# Patient Record
Sex: FEMALE | Race: WHITE | NOT HISPANIC OR LATINO
[De-identification: names, ages, dates, MRNs, and addresses within clinical notes are randomized per-mention and may not be internally consistent; named-entity substitution may affect disease eponyms.]

---

## 2018-02-12 ENCOUNTER — RESULT REVIEW (OUTPATIENT)
Age: 38
End: 2018-02-12

## 2018-03-06 ENCOUNTER — TRANSCRIPTION ENCOUNTER (OUTPATIENT)
Age: 38
End: 2018-03-06

## 2018-03-06 ENCOUNTER — APPOINTMENT (OUTPATIENT)
Dept: SURGERY | Facility: CLINIC | Age: 38
End: 2018-03-06
Payer: COMMERCIAL

## 2018-03-06 VITALS
BODY MASS INDEX: 34.82 KG/M2 | HEIGHT: 63.5 IN | SYSTOLIC BLOOD PRESSURE: 131 MMHG | HEART RATE: 115 BPM | DIASTOLIC BLOOD PRESSURE: 81 MMHG | WEIGHT: 199 LBS

## 2018-03-06 DIAGNOSIS — Z78.9 OTHER SPECIFIED HEALTH STATUS: ICD-10-CM

## 2018-03-06 DIAGNOSIS — Z80.6 FAMILY HISTORY OF LEUKEMIA: ICD-10-CM

## 2018-03-06 PROCEDURE — 99243 OFF/OP CNSLTJ NEW/EST LOW 30: CPT

## 2018-03-15 ENCOUNTER — OUTPATIENT (OUTPATIENT)
Dept: OUTPATIENT SERVICES | Facility: HOSPITAL | Age: 38
LOS: 1 days | End: 2018-03-15

## 2018-03-15 VITALS
HEART RATE: 97 BPM | TEMPERATURE: 99 F | RESPIRATION RATE: 16 BRPM | SYSTOLIC BLOOD PRESSURE: 120 MMHG | OXYGEN SATURATION: 99 % | HEIGHT: 63.5 IN | WEIGHT: 205.03 LBS | DIASTOLIC BLOOD PRESSURE: 80 MMHG

## 2018-03-15 DIAGNOSIS — D48.5 NEOPLASM OF UNCERTAIN BEHAVIOR OF SKIN: ICD-10-CM

## 2018-03-15 DIAGNOSIS — D48.9 NEOPLASM OF UNCERTAIN BEHAVIOR, UNSPECIFIED: ICD-10-CM

## 2018-03-15 LAB
HCT VFR BLD CALC: 43.1 % — SIGNIFICANT CHANGE UP (ref 34.5–45)
HGB BLD-MCNC: 14.5 G/DL — SIGNIFICANT CHANGE UP (ref 11.5–15.5)
MCHC RBC-ENTMCNC: 29.3 PG — SIGNIFICANT CHANGE UP (ref 27–34)
MCHC RBC-ENTMCNC: 33.6 % — SIGNIFICANT CHANGE UP (ref 32–36)
MCV RBC AUTO: 87.1 FL — SIGNIFICANT CHANGE UP (ref 80–100)
NRBC # FLD: 0 — SIGNIFICANT CHANGE UP
PLATELET # BLD AUTO: 316 K/UL — SIGNIFICANT CHANGE UP (ref 150–400)
PMV BLD: 9.6 FL — SIGNIFICANT CHANGE UP (ref 7–13)
RBC # BLD: 4.95 M/UL — SIGNIFICANT CHANGE UP (ref 3.8–5.2)
RBC # FLD: 12.2 % — SIGNIFICANT CHANGE UP (ref 10.3–14.5)
WBC # BLD: 6.92 K/UL — SIGNIFICANT CHANGE UP (ref 3.8–10.5)
WBC # FLD AUTO: 6.92 K/UL — SIGNIFICANT CHANGE UP (ref 3.8–10.5)

## 2018-03-15 NOTE — H&P PST ADULT - FAMILY HISTORY
Father  Still living? No  Family history of pericarditis, Age at diagnosis: Age Unknown     Mother  Still living? Yes, Estimated age: Age Unknown  Family history of CLL (chronic lymphoid leukemia), Age at diagnosis: Age Unknown

## 2018-03-15 NOTE — H&P PST ADULT - NSCAFFEAMTFREQ_GEN_ALL_CORE_SD
Discharge Summary/Instructions after an Endoscopic Procedure  Patient Name: Laz Quintero  Patient MRN: [id number]  Patient YOB: 1973 Friday, January 19, 2018  Malachi Olmedo MD  RESTRICTIONS:  During your procedure today, you received medications for sedation.  These   medications may affect your judgment, balance and coordination.  Therefore,   for 24 hours, you have the following restrictions:   - DO NOT drive a car, operate machinery, make legal/financial decisions,   sign important papers or drink alcohol.    ACTIVITY:  The following day: return to full activity including work, except no heavy   lifting, straining or running for 3 days if polyps were removed.  DIET:  Eat and drink normally unless instructed otherwise.     TREATMENT FOR COMMON SIDE EFFECTS:  - Mild abdominal pain, belching, bloating or excessive gas: rest, eat   lightly and use a heating pad.  - Sore Throat: treat with throat lozenges and/or gargle with warm salt   water.  SYMPTOMS TO WATCH FOR AND REPORT TO YOUR PHYSICIAN:  1. Abdominal pain or bloating, other than gas cramps.  2. Chest pain.  3. Back pain.  4. Chills or fever occurring within 24 hours after the procedure.  5. Rectal bleeding, which would show as bright red, maroon, or black stools.   (A tablespoon of blood from the rectum is not serious, especially if   hemorrhoids are present.)  6. Vomiting.  7. Weakness or dizziness.  8. Because air was used during the procedure, expelling large amounts of air   from your rectum or belching is normal.  9. If a bowel prep was taken, you may not have a bowel movement for 1-3   days.  This is normal.  GO DIRECTLY TO THE NEAREST EMERGENCY ROOM IF YOU HAVE ANY OF THE FOLLOWING:      Difficulty breathing  Chills and/or fever over 101 F   Persistent vomiting and/or vomiting blood   Severe abdominal pain   Severe chest pain   Black, tarry stools   Bleeding- more than one tablespoon   Any other symptom or condition that you may feel needs  urgent attention  Your doctor recommends these additional instructions:  If any biopsies were taken, your doctor s clinic will contact you in 1 to 2   weeks with any results.  We are waiting for your pathology results.   Telephone your GI clinic for pathology results in two weeks.   Your physician has recommended a colonoscopy now.  For questions, problems or results please call your physician - Malachi Olmedo MD at Work:  [phone].  [facility], EMERGENCY ROOM PHONE NUMBER: [phone]  IF A COMPLICATION OR EMERGENCY SITUATION ARISES AND YOU ARE UNABLE TO REACH   YOUR PHYSICIAN - GO DIRECTLY TO THE EMERGENCY ROOM.  Malachi Olmedo MD  1/19/2018 11:20:02 AM  This report has been verified and signed electronically.   1-2 cups/cans per day

## 2018-03-15 NOTE — H&P PST ADULT - NEGATIVE MUSCULOSKELETAL SYMPTOMS
no arthritis/no back pain/no joint swelling/no muscle weakness/no muscle cramps/no stiffness/no myalgia/no neck pain

## 2018-03-15 NOTE — H&P PST ADULT - NSANTHOSAYNRD_GEN_A_CORE
No. PACO screening performed.  STOP BANG Legend: 0-2 = LOW Risk; 3-4 = INTERMEDIATE Risk; 5-8 = HIGH Risk

## 2018-03-15 NOTE — H&P PST ADULT - MAMMOGRAM, LAST, PROFILE
Problem: Pressure Injury, Risk for  Goal: # Skin remains intact  Outcome: Outcome Met, Continue evaluating goal progress toward completion  Pt skin remains intact. No new pressure injury development. Pt repositions independently.     Problem: VTE, Risk for  Goal: # No s/s of VTE  Outcome: Outcome Met, Continue evaluating goal progress toward completion  No s/s of VTE.     Problem: Pain  Goal: #Acceptable pain/comfort level is achieved/maintained at rest (based on self report using Numeric Rating Scales/Faces  Outcome: Outcome Met, Continue evaluating goal progress toward completion  Pt has reported no pain throughout this shift.     Problem: Hemodialysis  Goal: Fistula/graft intact as evidenced by presence of bruit & thrill  Outcome: Outcome Met, Continue evaluating goal progress toward completion  Bruit and Thrill present.        3 years ago

## 2018-03-15 NOTE — H&P PST ADULT - ANESTHESIA, PREVIOUS REACTION, PROFILE
none/states no complications with anesthesia but they during her last  they told her she had scar tissue and had difficulty placing spinal - pt required general anesthesia states no complications with anesthesia but that during her last  they told her she had scar tissue and had difficulty placing spinal - pt required general anesthesia/none

## 2018-03-15 NOTE — H&P PST ADULT - PROBLEM SELECTOR PLAN 1
Excision Right Shoulder Mass scheduled on 3/16/18.  Pre-op instructions provided. Pt verbalized understanding.   Pepcid provided for GI prophylaxis.   Chlorhexidine wash provided and instructions given.

## 2018-03-15 NOTE — H&P PST ADULT - MUSCULOSKELETAL
details… detailed exam no calf tenderness/ROM intact/normal strength/no joint swelling/no joint erythema/no joint warmth

## 2018-03-15 NOTE — H&P PST ADULT - HISTORY OF PRESENT ILLNESS
37 year old female with lipoma to right shoulder increasing in size over the past 4 years. Pt presents today for presurgical evaluation for.. 37 year old female with soft nontender mass to right shoulder increasing in size over the past 4 years. Pt presents today for presurgical evaluation for Excision Right Shoulder Mass scheduled on 3/16/18.

## 2018-03-16 ENCOUNTER — OUTPATIENT (OUTPATIENT)
Dept: OUTPATIENT SERVICES | Facility: HOSPITAL | Age: 38
LOS: 1 days | Discharge: HOME | End: 2018-03-16
Payer: COMMERCIAL

## 2018-03-16 ENCOUNTER — OTHER (OUTPATIENT)
Age: 38
End: 2018-03-16

## 2018-03-16 ENCOUNTER — APPOINTMENT (OUTPATIENT)
Dept: SURGERY | Facility: HOSPITAL | Age: 38
End: 2018-03-16

## 2018-03-16 ENCOUNTER — TRANSCRIPTION ENCOUNTER (OUTPATIENT)
Age: 38
End: 2018-03-16

## 2018-03-16 ENCOUNTER — RESULT REVIEW (OUTPATIENT)
Age: 38
End: 2018-03-16

## 2018-03-16 VITALS
WEIGHT: 205.03 LBS | OXYGEN SATURATION: 100 % | TEMPERATURE: 98 F | HEIGHT: 63.5 IN | DIASTOLIC BLOOD PRESSURE: 72 MMHG | RESPIRATION RATE: 18 BRPM | HEART RATE: 84 BPM | SYSTOLIC BLOOD PRESSURE: 115 MMHG

## 2018-03-16 VITALS
RESPIRATION RATE: 16 BRPM | OXYGEN SATURATION: 100 % | DIASTOLIC BLOOD PRESSURE: 59 MMHG | SYSTOLIC BLOOD PRESSURE: 106 MMHG | HEART RATE: 65 BPM | TEMPERATURE: 98 F

## 2018-03-16 DIAGNOSIS — D48.5 NEOPLASM OF UNCERTAIN BEHAVIOR OF SKIN: ICD-10-CM

## 2018-03-16 PROCEDURE — 23073 EXC SHOULDER TUM DEEP 5 CM/>: CPT

## 2018-03-16 PROCEDURE — 88304 TISSUE EXAM BY PATHOLOGIST: CPT | Mod: 26

## 2018-03-16 PROCEDURE — 13101 CMPLX RPR TRUNK 2.6-7.5 CM: CPT | Mod: 59

## 2018-03-16 NOTE — BRIEF OPERATIVE NOTE - PROCEDURE
<<-----Click on this checkbox to enter Procedure Excision of mass of right shoulder  03/16/2018    Active  DEBORAH

## 2018-03-16 NOTE — ASU DISCHARGE PLAN (ADULT/PEDIATRIC). - NOTIFY
Swelling that continues/Persistent Nausea and Vomiting/Fever greater than 101/Inability to Tolerate Liquids or Foods/Bleeding that does not stop/Pain not relieved by Medications

## 2018-03-26 LAB — SURGICAL PATHOLOGY STUDY: SIGNIFICANT CHANGE UP

## 2018-03-27 ENCOUNTER — APPOINTMENT (OUTPATIENT)
Dept: SURGERY | Facility: CLINIC | Age: 38
End: 2018-03-27
Payer: COMMERCIAL

## 2018-03-27 PROCEDURE — 99213 OFFICE O/P EST LOW 20 MIN: CPT

## 2018-07-10 ENCOUNTER — APPOINTMENT (OUTPATIENT)
Dept: SURGERY | Facility: CLINIC | Age: 38
End: 2018-07-10
Payer: COMMERCIAL

## 2018-07-10 ENCOUNTER — APPOINTMENT (OUTPATIENT)
Dept: SURGERY | Facility: CLINIC | Age: 38
End: 2018-07-10

## 2018-07-10 PROCEDURE — 99213 OFFICE O/P EST LOW 20 MIN: CPT

## 2018-09-11 NOTE — ASU DISCHARGE PLAN (ADULT/PEDIATRIC). - FOLLOWUP APPOINTMENT CLINIC/PHYSICIAN
DOS 11/5/18 [place]    Confirm with patient:pt  Laterality: right  Insurance: emery  PCP: pt will call back to let me know which PCP he will be seeing and date of appointment  Weight Loss medications: no  Case:  yes  Preop: yes  Postop:  yes  Surg in [place]: yes  [id number]: yes  Conf letter mailed on: 9/11/18  Verbally confirmed no aspirin or anti-inflammatory meds or weight loss medications for 10 days prior to surgery, NPO instructions 8 hours prior/NPO after midnight night before surgery, preop with PCP 2-3 weeks prior to surgery, ride home if Out Patient and anesthesia: yes  Service to FP/IM/Cardiology: NEED TO SEND WHEN PATIENT CALLS BACK WITH PHYSICIANS NAME  Messaged [phone] [address] nurse pool with surgery date for patient: yes      Surgery scheduled   Received: Today   Message Contents   Kathy Dumont Nurse Msg Pool      Â       Surgery scheduled 11/5/18 [place]. Right TKA. Make appointment for  1 to 2 weeks with Dr. Darnell after surgery.

## 2019-02-26 ENCOUNTER — RESULT REVIEW (OUTPATIENT)
Age: 39
End: 2019-02-26

## 2019-06-03 ENCOUNTER — TRANSCRIPTION ENCOUNTER (OUTPATIENT)
Age: 39
End: 2019-06-03

## 2020-03-13 PROBLEM — H93.19 TINNITUS, UNSPECIFIED EAR: Chronic | Status: ACTIVE | Noted: 2018-03-15

## 2020-03-13 PROBLEM — Z97.5 PRESENCE OF (INTRAUTERINE) CONTRACEPTIVE DEVICE: Chronic | Status: ACTIVE | Noted: 2018-03-15

## 2020-04-08 ENCOUNTER — TRANSCRIPTION ENCOUNTER (OUTPATIENT)
Age: 40
End: 2020-04-08

## 2020-04-20 ENCOUNTER — APPOINTMENT (OUTPATIENT)
Dept: ENDOCRINOLOGY | Facility: CLINIC | Age: 40
End: 2020-04-20
Payer: COMMERCIAL

## 2020-04-20 DIAGNOSIS — Z86.018 PERSONAL HISTORY OF OTHER BENIGN NEOPLASM: ICD-10-CM

## 2020-04-20 DIAGNOSIS — R76.8 OTHER SPECIFIED ABNORMAL IMMUNOLOGICAL FINDINGS IN SERUM: ICD-10-CM

## 2020-04-20 DIAGNOSIS — L65.9 NONSCARRING HAIR LOSS, UNSPECIFIED: ICD-10-CM

## 2020-04-20 PROCEDURE — 99203 OFFICE O/P NEW LOW 30 MIN: CPT | Mod: 95

## 2020-04-20 NOTE — REASON FOR VISIT
[Other Location: e.g. School (Enter Location, City,State)___] : at [unfilled], at the time of the visit. [Other Location: e.g. Home (Enter Location, City,State)___] : at [unfilled] [Patient] : the patient [Consultation] : a consultation visit [Other___] : [unfilled] [FreeTextEntry2] : Dr. Wilfrido Mcgrath

## 2020-04-20 NOTE — ASSESSMENT
[FreeTextEntry1] : This is a 39 year old female with hair loss and elevated DHEAS level. \par She has had normal menstrual cycle throughout her life. She has a Mirena IUD. \par Check repeat thyroid abs as she reports tg ab were positive in the past. \par Check TFTs. \par Check testosterone. \par Further management including possibly starting thyroid hormone replacement will be based on above results.

## 2020-04-20 NOTE — CONSULT LETTER
[Dear  ___] : Dear  [unfilled], [Please see my note below.] : Please see my note below. [Consult Letter:] : I had the pleasure of evaluating your patient, [unfilled]. [Consult Closing:] : Thank you very much for allowing me to participate in the care of this patient.  If you have any questions, please do not hesitate to contact me. [Sincerely,] : Sincerely, [FreeTextEntry3] : Shoaib Maier MD, FACE\par

## 2020-04-20 NOTE — REVIEW OF SYSTEMS
[Acne] : acne [Hair Loss] : hair loss [Anxiety] : anxiety [All other systems negative] : All other systems negative [FreeTextEntry8] : dysmenorrhea

## 2020-04-20 NOTE — PAST MEDICAL HISTORY
[Menstruating] : The patient is menstruating [Excessive Bleeding] : there was excessive bleeding [Menarche Age ____] : age at menarche was [unfilled] [Dysmenorrhea] : dysmenorrhea [Regular Cycle Intervals] : have been regular [Live Births ___] : P[unfilled]

## 2020-04-20 NOTE — HISTORY OF PRESENT ILLNESS
[FreeTextEntry1] : Mrs. Alexis was the only participant in this video viist. \par \par CC: Check hormones\par \par This is a 39 year old female with hair loss and elevated DHEAS level. \par She reports that she has been experiencing hair loss/thinning for a few years. She had recent bloodwork which showed DHEAS of 285 and repeat 319. She has taken Biotin in the past and was on Biotin during second blood test. \par She was told in the past that she has positive thyroglobulin abs. She is not on thyroid hormone replacement. \par She has a mirena IUD. \par

## 2020-05-01 LAB
ALBUMIN SERPL ELPH-MCNC: 4.7 G/DL
ALP BLD-CCNC: 52 U/L
ALT SERPL-CCNC: 10 U/L
ANION GAP SERPL CALC-SCNC: 17 MMOL/L
AST SERPL-CCNC: 12 U/L
BASOPHILS # BLD AUTO: 0.04 K/UL
BASOPHILS NFR BLD AUTO: 0.5 %
BILIRUB SERPL-MCNC: 0.9 MG/DL
BUN SERPL-MCNC: 9 MG/DL
CALCIUM SERPL-MCNC: 9.5 MG/DL
CHLORIDE SERPL-SCNC: 102 MMOL/L
CO2 SERPL-SCNC: 21 MMOL/L
CREAT SERPL-MCNC: 0.8 MG/DL
DHEA-S SERPL-MCNC: 310 UG/DL
EOSINOPHIL # BLD AUTO: 0.07 K/UL
EOSINOPHIL NFR BLD AUTO: 0.9 %
GLUCOSE SERPL-MCNC: 70 MG/DL
HCT VFR BLD CALC: 44 %
HGB BLD-MCNC: 14.1 G/DL
IMM GRANULOCYTES NFR BLD AUTO: 0.3 %
LYMPHOCYTES # BLD AUTO: 2.63 K/UL
LYMPHOCYTES NFR BLD AUTO: 34 %
MAN DIFF?: NORMAL
MCHC RBC-ENTMCNC: 29.1 PG
MCHC RBC-ENTMCNC: 32 GM/DL
MCV RBC AUTO: 90.9 FL
MONOCYTES # BLD AUTO: 0.52 K/UL
MONOCYTES NFR BLD AUTO: 6.7 %
NEUTROPHILS # BLD AUTO: 4.46 K/UL
NEUTROPHILS NFR BLD AUTO: 57.6 %
PLATELET # BLD AUTO: 320 K/UL
POTASSIUM SERPL-SCNC: 4.5 MMOL/L
PROT SERPL-MCNC: 7.3 G/DL
RBC # BLD: 4.84 M/UL
RBC # FLD: 12.5 %
SODIUM SERPL-SCNC: 140 MMOL/L
T4 FREE SERPL-MCNC: 1.3 NG/DL
THYROGLOB AB SERPL-ACNC: <20 IU/ML
THYROPEROXIDASE AB SERPL IA-ACNC: <10 IU/ML
TSH SERPL-ACNC: 3.84 UIU/ML
WBC # FLD AUTO: 7.74 K/UL

## 2020-05-06 LAB
TESTOST BND SERPL-MCNC: 3.2 PG/ML
TESTOST SERPL-MCNC: 23.6 NG/DL

## 2020-11-03 LAB — DHEA-S SERPL-MCNC: 316 UG/DL

## 2020-11-05 DIAGNOSIS — E27.8 OTHER SPECIFIED DISORDERS OF ADRENAL GLAND: ICD-10-CM

## 2021-05-13 ENCOUNTER — LABORATORY RESULT (OUTPATIENT)
Age: 41
End: 2021-05-13

## 2022-05-25 ENCOUNTER — APPOINTMENT (OUTPATIENT)
Dept: PULMONOLOGY | Facility: CLINIC | Age: 42
End: 2022-05-25

## 2023-01-27 ENCOUNTER — OFFICE (OUTPATIENT)
Dept: URBAN - METROPOLITAN AREA CLINIC 90 | Facility: CLINIC | Age: 43
Setting detail: OPHTHALMOLOGY
End: 2023-01-27
Payer: MEDICAID

## 2023-01-27 DIAGNOSIS — H01.002: ICD-10-CM

## 2023-01-27 DIAGNOSIS — E05.00: ICD-10-CM

## 2023-01-27 DIAGNOSIS — H01.001: ICD-10-CM

## 2023-01-27 DIAGNOSIS — H01.004: ICD-10-CM

## 2023-01-27 PROBLEM — H01.005 BLEPHARITIS; RIGHT UPPER LID, LEFT UPPER LID , RIGHT LOWER LID, LEFT LOWER LID: Status: ACTIVE | Noted: 2023-01-27

## 2023-01-27 PROCEDURE — 92014 COMPRE OPH EXAM EST PT 1/>: CPT | Performed by: OPHTHALMOLOGY

## 2023-01-27 ASSESSMENT — KERATOMETRY
OS_K1POWER_DIOPTERS: 44.25
OD_AXISANGLE_DEGREES: 078
OD_K2POWER_DIOPTERS: 45.75
METHOD_AUTO_MANUAL: AUTO
OD_K1POWER_DIOPTERS: 44.50
OS_AXISANGLE_DEGREES: 103
OS_K2POWER_DIOPTERS: 45.75

## 2023-01-27 ASSESSMENT — REFRACTION_MANIFEST
OD_VA1: 20/20
OS_CYLINDER: -1.50
OU_VA: 20/20
OS_CYLINDER: -1.50
OD_SPHERE: -3.25
OS_SPHERE: -6.25
OS_AXIS: 020
OU_VA: 20/20
OS_SPHERE: -4.25
OD_SPHERE: -5.50
OD_VA1: 20/20
OS_VA1: 20/20
OS_VA1: 20/20
OS_AXIS: 020
OD_AXIS: 162
OD_CYLINDER: -1.25
OD_CYLINDER: -1.25
OD_AXIS: 162

## 2023-01-27 ASSESSMENT — REFRACTION_CURRENTRX
OS_CYLINDER: -1.25
OS_CYLINDER: +1.25
OS_VPRISM_DIRECTION: SV
OS_OVR_VA: 20/
OS_AXIS: 108
OS_SPHERE: -7.00
OD_OVR_VA: 20/
OD_CYLINDER: +1.00
OS_OVR_VA: 20/
OD_SPHERE: -5.75
OD_AXIS: 160
OS_VPRISM_DIRECTION: SV
OD_VPRISM_DIRECTION: SV
OD_OVR_VA: 20/
OS_AXIS: 025
OD_AXIS: 061
OD_VPRISM_DIRECTION: SV
OD_CYLINDER: -1.00
OD_SPHERE: -6.75
OS_SPHERE: -5.75

## 2023-01-27 ASSESSMENT — VISUAL ACUITY
OS_BCVA: 20/20-1
OD_BCVA: 20/25+2

## 2023-01-27 ASSESSMENT — TONOMETRY
OS_IOP_MMHG: 19
OD_IOP_MMHG: 20

## 2023-01-27 ASSESSMENT — REFRACTION_AUTOREFRACTION
OD_SPHERE: -5.25
OS_CYLINDER: -1.50
OS_AXIS: 020
OD_CYLINDER: -1.25
OD_AXIS: 162
OS_SPHERE: -5.75

## 2023-01-27 ASSESSMENT — CONFRONTATIONAL VISUAL FIELD TEST (CVF)
OD_FINDINGS: FULL
OS_FINDINGS: FULL

## 2023-01-27 ASSESSMENT — SPHEQUIV_DERIVED
OD_SPHEQUIV: -3.875
OS_SPHEQUIV: -6.5
OS_SPHEQUIV: -7
OD_SPHEQUIV: -5.875
OD_SPHEQUIV: -6.125
OS_SPHEQUIV: -5

## 2023-01-27 ASSESSMENT — AXIALLENGTH_DERIVED
OD_AL: 25.4147
OS_AL: 25.7551
OS_AL: 25.9893
OD_AL: 25.5282
OD_AL: 24.5415
OS_AL: 25.0769

## 2023-01-27 ASSESSMENT — LID EXAM ASSESSMENTS
OS_BLEPHARITIS: LLL LUL
OD_BLEPHARITIS: RLL RUL

## 2023-09-10 NOTE — HISTORY OF PRESENT ILLNESS
[TextBox_4] :  year old patient presents for evaluation of   The problem started  months ago.    Primary doctor is    PSH:     PMH:       SH:  never smoker  former smoker  active smoker  packs per day  smoked for   years  stopped smoking   years ago  ETOH:  occasional   Occupation: retired, worked as   No exposure to chemicals, dust, asbestos, mold  lives in     ALLERGY:  NKDA  allergic to   Reaction is   environmental/seasonal allergy:    Review of Systems:  No rash, skin problems No dry eyes no mouth ulcers no sinusitis, sinus infections, nasal obstruction no dysphagia no dry mouth  no arthritis no joint aches no joint swelling   no pneumonia no wheeze no lung cancer  no CAD no MI no chest pain no murmur no CHF no HTN no edema  no peptic ulcer or gastritis no GERD no abdominal pain no liver disease  no Diabetes no thyroid disease no hyperlipidemia   no bleeding  no DVT or PE  no kidney disease  no stroke no seizure

## 2023-09-11 ENCOUNTER — APPOINTMENT (OUTPATIENT)
Dept: PULMONOLOGY | Facility: CLINIC | Age: 43
End: 2023-09-11
Payer: COMMERCIAL

## 2023-09-11 VITALS
OXYGEN SATURATION: 99 % | BODY MASS INDEX: 41.82 KG/M2 | WEIGHT: 239 LBS | HEART RATE: 118 BPM | DIASTOLIC BLOOD PRESSURE: 90 MMHG | TEMPERATURE: 97.5 F | SYSTOLIC BLOOD PRESSURE: 140 MMHG | HEIGHT: 63.5 IN

## 2023-09-11 PROCEDURE — 99203 OFFICE O/P NEW LOW 30 MIN: CPT

## 2023-09-11 RX ORDER — CEPHALEXIN 500 MG/1
500 TABLET ORAL 3 TIMES DAILY
Qty: 21 | Refills: 0 | Status: ACTIVE | COMMUNITY
Start: 2023-09-11 | End: 1900-01-01

## 2023-09-19 ENCOUNTER — APPOINTMENT (OUTPATIENT)
Dept: PULMONOLOGY | Facility: CLINIC | Age: 43
End: 2023-09-19
Payer: COMMERCIAL

## 2023-09-19 VITALS
WEIGHT: 236 LBS | TEMPERATURE: 98.4 F | OXYGEN SATURATION: 98 % | SYSTOLIC BLOOD PRESSURE: 130 MMHG | BODY MASS INDEX: 41.15 KG/M2 | DIASTOLIC BLOOD PRESSURE: 84 MMHG | HEART RATE: 105 BPM

## 2023-09-19 DIAGNOSIS — L03.019 CELLULITIS OF UNSPECIFIED FINGER: ICD-10-CM

## 2023-09-19 DIAGNOSIS — R06.00 DYSPNEA, UNSPECIFIED: ICD-10-CM

## 2023-09-19 PROCEDURE — 94060 EVALUATION OF WHEEZING: CPT

## 2023-09-19 PROCEDURE — 99214 OFFICE O/P EST MOD 30 MIN: CPT | Mod: 25

## 2023-09-19 PROCEDURE — 94727 GAS DIL/WSHOT DETER LNG VOL: CPT

## 2023-09-19 PROCEDURE — 94729 DIFFUSING CAPACITY: CPT

## 2024-02-10 ENCOUNTER — OFFICE (OUTPATIENT)
Dept: URBAN - METROPOLITAN AREA CLINIC 90 | Facility: CLINIC | Age: 44
Setting detail: OPHTHALMOLOGY
End: 2024-02-10
Payer: MEDICAID

## 2024-02-10 DIAGNOSIS — H01.00B: ICD-10-CM

## 2024-02-10 DIAGNOSIS — H52.4: ICD-10-CM

## 2024-02-10 DIAGNOSIS — H52.13: ICD-10-CM

## 2024-02-10 DIAGNOSIS — H01.00A: ICD-10-CM

## 2024-02-10 DIAGNOSIS — E05.00: ICD-10-CM

## 2024-02-10 PROBLEM — H01.005 BLEPHARITIS; RIGHT UPPER LID, LEFT UPPER LID , RIGHT LOWER LID, LEFT LOWER LID: Status: ACTIVE | Noted: 2024-02-10

## 2024-02-10 PROBLEM — H01.002 BLEPHARITIS; RIGHT UPPER LID, LEFT UPPER LID , RIGHT LOWER LID, LEFT LOWER LID: Status: ACTIVE | Noted: 2024-02-10

## 2024-02-10 PROBLEM — H01.004 BLEPHARITIS; RIGHT UPPER LID, LEFT UPPER LID , RIGHT LOWER LID, LEFT LOWER LID: Status: ACTIVE | Noted: 2024-02-10

## 2024-02-10 PROBLEM — H01.001 BLEPHARITIS; RIGHT UPPER LID, LEFT UPPER LID , RIGHT LOWER LID, LEFT LOWER LID: Status: ACTIVE | Noted: 2024-02-10

## 2024-02-10 PROCEDURE — 92015 DETERMINE REFRACTIVE STATE: CPT | Performed by: OPHTHALMOLOGY

## 2024-02-10 PROCEDURE — 92014 COMPRE OPH EXAM EST PT 1/>: CPT | Performed by: OPHTHALMOLOGY

## 2024-02-10 ASSESSMENT — REFRACTION_MANIFEST
OD_VA1: 20/20
OS_AXIS: 020
OD_AXIS: 162
OS_CYLINDER: -1.50
OS_AXIS: 020
OD_CYLINDER: -1.25
OD_SPHERE: -5.50
OD_AXIS: 160
OD_CYLINDER: -1.25
OD_VA1: 20/20
OD_VA1: 20/20
OD_CYLINDER: -1.25
OD_SPHERE: -5.75
OS_VA1: 20/20
OS_AXIS: 020
OU_VA: 20/20
OS_SPHERE: -6.25
OS_VA1: 20/20
OU_VA: 20/20
OD_SPHERE: -3.25
OU_VA: 20/20
OS_ADD: +1.50
OS_SPHERE: -5.75
OS_VA1: 20/20
OD_AXIS: 162
OD_VA2: 20/20(J1+)
OS_CYLINDER: -1.50
OS_VA2: 20/20(J1+)
OS_SPHERE: -4.25
OS_CYLINDER: -1.50
OD_ADD: +1.50

## 2024-02-10 ASSESSMENT — REFRACTION_CURRENTRX
OD_OVR_VA: 20/
OS_CYLINDER: -1.25
OS_VPRISM_DIRECTION: SV
OS_SPHERE: -5.75
OD_VPRISM_DIRECTION: SV
OS_AXIS: 016
OD_AXIS: 150
OS_OVR_VA: 20/
OS_OVR_VA: 20/
OS_VPRISM_DIRECTION: SV
OD_VPRISM_DIRECTION: SV
OD_CYLINDER: -1.00
OS_AXIS: 025
OD_CYLINDER: -1.00
OD_SPHERE: -5.75
OD_OVR_VA: 20/
OD_AXIS: 160
OD_SPHERE: -5.75
OS_CYLINDER: -1.25
OS_SPHERE: -5.75

## 2024-02-10 ASSESSMENT — REFRACTION_AUTOREFRACTION
OD_SPHERE: -5.25
OD_CYLINDER: -1.50
OS_SPHERE: -5.50
OS_CYLINDER: -1.50
OS_AXIS: 020
OD_AXIS: 163

## 2024-02-10 ASSESSMENT — SPHEQUIV_DERIVED
OD_SPHEQUIV: -3.875
OD_SPHEQUIV: -6
OD_SPHEQUIV: -6.125
OS_SPHEQUIV: -7
OS_SPHEQUIV: -6.5
OS_SPHEQUIV: -6.25
OS_SPHEQUIV: -5
OD_SPHEQUIV: -6.375

## 2024-02-10 ASSESSMENT — LID EXAM ASSESSMENTS
OS_BLEPHARITIS: LLL LUL
OD_BLEPHARITIS: RLL RUL

## 2024-02-10 ASSESSMENT — CONFRONTATIONAL VISUAL FIELD TEST (CVF)
OD_FINDINGS: FULL
OS_FINDINGS: FULL

## 2024-07-25 DIAGNOSIS — F40.243 FEAR OF FLYING: ICD-10-CM

## 2024-07-25 RX ORDER — ALPRAZOLAM 2 MG/1
2 TABLET ORAL DAILY
Qty: 10 | Refills: 0 | Status: ACTIVE | COMMUNITY
Start: 2024-07-25 | End: 1900-01-01

## 2024-10-07 NOTE — H&P PST ADULT - NS PRO FEM REPRO BREAST EXAM FREQ
October 7, 2024     Patient: Larisa Calabrese   YOB: 2010   Date of Visit: 10/7/2024       To Whom it May Concern:    Larisa Calabrese was seen in my clinic on 10/7/2024 at 1:10 pm.    Please excuse Larisa for her absence from work on the date listed above to be able to make her appointment.please excuse the patient and (mother) patient is a minor from work on the following dates of 10/28/2024-11/11/2024.please contact the office if you have any questions or concerns.    Sincerely,         Ney Chauhan MD    Medical information is confidential and cannot be disclosed without the written consent of the patient or her representative.     monthly

## 2024-11-04 ENCOUNTER — LABORATORY RESULT (OUTPATIENT)
Age: 44
End: 2024-11-04

## 2024-11-04 ENCOUNTER — APPOINTMENT (OUTPATIENT)
Age: 44
End: 2024-11-04
Payer: COMMERCIAL

## 2024-11-04 VITALS
BODY MASS INDEX: 32.78 KG/M2 | RESPIRATION RATE: 14 BRPM | HEIGHT: 63 IN | HEART RATE: 94 BPM | SYSTOLIC BLOOD PRESSURE: 114 MMHG | TEMPERATURE: 98.3 F | DIASTOLIC BLOOD PRESSURE: 78 MMHG | WEIGHT: 185 LBS | OXYGEN SATURATION: 100 %

## 2024-11-04 DIAGNOSIS — Z12.11 ENCOUNTER FOR SCREENING FOR MALIGNANT NEOPLASM OF COLON: ICD-10-CM

## 2024-11-04 DIAGNOSIS — E66.811 OBESITY, CLASS 1: ICD-10-CM

## 2024-11-04 DIAGNOSIS — Z13.31 ENCOUNTER FOR SCREENING FOR DEPRESSION: ICD-10-CM

## 2024-11-04 DIAGNOSIS — Z00.00 ENCOUNTER FOR GENERAL ADULT MEDICAL EXAMINATION W/OUT ABNORMAL FINDINGS: ICD-10-CM

## 2024-11-04 DIAGNOSIS — N39.0 URINARY TRACT INFECTION, SITE NOT SPECIFIED: ICD-10-CM

## 2024-11-04 PROCEDURE — 99386 PREV VISIT NEW AGE 40-64: CPT

## 2024-11-04 PROCEDURE — G0444 DEPRESSION SCREEN ANNUAL: CPT | Mod: 59

## 2024-11-04 RX ORDER — TIRZEPATIDE 10 MG/.5ML
10 INJECTION, SOLUTION SUBCUTANEOUS DAILY
Qty: 1 | Refills: 0 | Status: ACTIVE | COMMUNITY
Start: 2024-11-04

## 2024-11-04 RX ORDER — NITROFURANTOIN MACROCRYSTALS 100 MG/1
100 CAPSULE ORAL
Qty: 60 | Refills: 0 | Status: ACTIVE | COMMUNITY
Start: 2024-11-04 | End: 1900-01-01

## 2024-11-06 LAB
ALBUMIN SERPL ELPH-MCNC: 4.6 G/DL
ALP BLD-CCNC: 66 U/L
ALT SERPL-CCNC: 7 U/L
ANION GAP SERPL CALC-SCNC: 16 MMOL/L
APPEARANCE: CLEAR
AST SERPL-CCNC: 14 U/L
BILIRUB SERPL-MCNC: 0.6 MG/DL
BILIRUBIN URINE: NEGATIVE
BLOOD URINE: NEGATIVE
BUN SERPL-MCNC: 12 MG/DL
CALCIUM SERPL-MCNC: 9.1 MG/DL
CHLORIDE SERPL-SCNC: 101 MMOL/L
CHOLEST SERPL-MCNC: 173 MG/DL
CO2 SERPL-SCNC: 18 MMOL/L
COLOR: YELLOW
CREAT SERPL-MCNC: 0.81 MG/DL
CREAT SPEC-SCNC: 23 MG/DL
EGFR: 91 ML/MIN/1.73M2
ESTIMATED AVERAGE GLUCOSE: 97 MG/DL
GLUCOSE QUALITATIVE U: NEGATIVE MG/DL
GLUCOSE SERPL-MCNC: 83 MG/DL
HBA1C MFR BLD HPLC: 5 %
HDLC SERPL-MCNC: 70 MG/DL
KETONES URINE: NEGATIVE MG/DL
LDLC SERPL CALC-MCNC: 92 MG/DL
LEUKOCYTE ESTERASE URINE: NEGATIVE
MICROALBUMIN 24H UR DL<=1MG/L-MCNC: <1.2 MG/DL
MICROALBUMIN/CREAT 24H UR-RTO: NORMAL MG/G
NITRITE URINE: NEGATIVE
NONHDLC SERPL-MCNC: 103 MG/DL
PH URINE: 6
POTASSIUM SERPL-SCNC: 5.2 MMOL/L
PROT SERPL-MCNC: 7.6 G/DL
PROTEIN URINE: NEGATIVE MG/DL
SODIUM SERPL-SCNC: 135 MMOL/L
SPECIFIC GRAVITY URINE: <1.005
TRIGL SERPL-MCNC: 52 MG/DL
TSH SERPL-ACNC: 4.66 UIU/ML
UROBILINOGEN URINE: 0.2 MG/DL

## 2024-11-14 LAB
BASOPHILS # BLD AUTO: 0.04 K/UL
BASOPHILS NFR BLD AUTO: 0.5 %
EOSINOPHIL # BLD AUTO: 0.05 K/UL
EOSINOPHIL NFR BLD AUTO: 0.6 %
HCT VFR BLD CALC: 40.7 %
HGB BLD-MCNC: 13 G/DL
IMM GRANULOCYTES NFR BLD AUTO: 0.4 %
LYMPHOCYTES # BLD AUTO: 2.83 K/UL
LYMPHOCYTES NFR BLD AUTO: 34 %
MAN DIFF?: NORMAL
MCHC RBC-ENTMCNC: 27.7 PG
MCHC RBC-ENTMCNC: 31.9 G/DL
MCV RBC AUTO: 86.8 FL
MONOCYTES # BLD AUTO: 0.53 K/UL
MONOCYTES NFR BLD AUTO: 6.4 %
NEUTROPHILS # BLD AUTO: 4.85 K/UL
NEUTROPHILS NFR BLD AUTO: 58.1 %
PLATELET # BLD AUTO: 420 K/UL
RBC # BLD: 4.69 M/UL
RBC # FLD: 13.9 %
WBC # FLD AUTO: 8.33 K/UL

## 2025-01-31 RX ORDER — CIPROFLOXACIN HYDROCHLORIDE 500 MG/1
500 TABLET, FILM COATED ORAL
Qty: 10 | Refills: 0 | Status: ACTIVE | COMMUNITY
Start: 2025-01-31 | End: 1900-01-01

## 2025-03-05 ENCOUNTER — OFFICE (OUTPATIENT)
Facility: LOCATION | Age: 45
Setting detail: OPHTHALMOLOGY
End: 2025-03-05
Payer: COMMERCIAL

## 2025-03-05 DIAGNOSIS — H52.4: ICD-10-CM

## 2025-03-05 DIAGNOSIS — H01.004: ICD-10-CM

## 2025-03-05 DIAGNOSIS — H01.001: ICD-10-CM

## 2025-03-05 DIAGNOSIS — H01.002: ICD-10-CM

## 2025-03-05 DIAGNOSIS — E05.00: ICD-10-CM

## 2025-03-05 PROBLEM — H25.13 CATARACT SENILE NUCLEAR SCLEROSIS; BOTH EYES: Status: ACTIVE | Noted: 2025-03-05

## 2025-03-05 PROCEDURE — 92014 COMPRE OPH EXAM EST PT 1/>: CPT | Performed by: OPHTHALMOLOGY

## 2025-03-05 PROCEDURE — 92015 DETERMINE REFRACTIVE STATE: CPT | Performed by: OPHTHALMOLOGY

## 2025-03-05 ASSESSMENT — REFRACTION_CURRENTRX
OD_SPHERE: -5.75
OS_VPRISM_DIRECTION: SV
OD_AXIS: 150
OD_AXIS: 160
OD_AXIS: 150
OS_CYLINDER: -1.25
OD_VPRISM_DIRECTION: SV
OS_CYLINDER: -1.25
OD_OVR_VA: 20/
OS_VPRISM_DIRECTION: SV
OS_SPHERE: -5.75
OS_CYLINDER: -1.25
OS_AXIS: 025
OS_OVR_VA: 20/
OD_VPRISM_DIRECTION: SV
OD_OVR_VA: 20/
OS_AXIS: 016
OD_OVR_VA: 20/
OD_SPHERE: -5.75
OS_SPHERE: -5.75
OD_SPHERE: -5.75
OD_CYLINDER: -1.00
OD_CYLINDER: -1.00
OS_OVR_VA: 20/
OS_AXIS: 016
OS_SPHERE: -5.75
OD_CYLINDER: -1.00
OS_OVR_VA: 20/

## 2025-03-05 ASSESSMENT — REFRACTION_MANIFEST
OS_CYLINDER: -1.50
OD_ADD: +1.50
OD_SPHERE: -5.50
OU_VA: 20/20
OS_SPHERE: -5.75
OS_VA1: 20/20
OD_AXIS: 162
OS_SPHERE: -5.75
OD_CYLINDER: -1.25
OD_AXIS: 162
OS_CYLINDER: -1.75
OU_VA: 20/20
OS_VA1: 20/20
OS_SPHERE: -6.25
OS_AXIS: 020
OS_AXIS: 020
OD_SPHERE: -3.25
OS_SPHERE: -4.25
OS_VA1: 20/20
OD_AXIS: 160
OD_SPHERE: -6.00
OD_VA1: 20/20
OD_VA1: 20/20
OD_CYLINDER: -1.25
OU_VA: 20/20
OS_ADD: +1.50
OD_AXIS: 160
OD_CYLINDER: -1.50
OS_CYLINDER: -1.50
OD_ADD: +1.50
OS_CYLINDER: -1.50
OS_VA1: 20/20-
OS_AXIS: 020
OS_AXIS: 020
OU_VA: 20/20
OS_ADD: +1.50
OD_VA1: 20/20
OD_VA1: 20/20
OD_VA2: 20/20(J1+)
OS_VA2: 20/20(J1+)
OD_CYLINDER: -1.25
OD_SPHERE: -5.75

## 2025-03-05 ASSESSMENT — LID EXAM ASSESSMENTS
OS_BLEPHARITIS: LLL LUL
OD_BLEPHARITIS: RLL RUL

## 2025-03-05 ASSESSMENT — REFRACTION_AUTOREFRACTION
OS_AXIS: 019
OD_CYLINDER: -1.25
OD_AXIS: 163
OS_CYLINDER: -1.75
OS_SPHERE: -5.25
OD_SPHERE: -5.50

## 2025-03-05 ASSESSMENT — KERATOMETRY
OS_AXISANGLE_DEGREES: 106
OS_K2POWER_DIOPTERS: 45.75
METHOD_AUTO_MANUAL: AUTO
OD_AXISANGLE_DEGREES: 071
OS_K1POWER_DIOPTERS: 44.25
OD_K1POWER_DIOPTERS: 44.50
OD_K2POWER_DIOPTERS: 45.75

## 2025-03-05 ASSESSMENT — VISUAL ACUITY
OD_BCVA: 20/20-2
OS_BCVA: 20/20-1

## 2025-03-05 ASSESSMENT — CONFRONTATIONAL VISUAL FIELD TEST (CVF)
OD_FINDINGS: FULL
OS_FINDINGS: FULL

## 2025-03-05 ASSESSMENT — TONOMETRY
OD_IOP_MMHG: 15
OS_IOP_MMHG: 16

## 2025-04-01 ENCOUNTER — APPOINTMENT (OUTPATIENT)
Dept: GASTROENTEROLOGY | Facility: CLINIC | Age: 45
End: 2025-04-01
Payer: COMMERCIAL

## 2025-04-01 VITALS
BODY MASS INDEX: 29.23 KG/M2 | WEIGHT: 165 LBS | RESPIRATION RATE: 14 BRPM | HEART RATE: 78 BPM | HEIGHT: 63 IN | DIASTOLIC BLOOD PRESSURE: 78 MMHG | SYSTOLIC BLOOD PRESSURE: 110 MMHG | OXYGEN SATURATION: 98 %

## 2025-04-01 DIAGNOSIS — R10.13 EPIGASTRIC PAIN: ICD-10-CM

## 2025-04-01 PROCEDURE — 99203 OFFICE O/P NEW LOW 30 MIN: CPT

## 2025-04-01 RX ORDER — SODIUM SULFATE, POTASSIUM SULFATE AND MAGNESIUM SULFATE 1.6; 3.13; 17.5 G/177ML; G/177ML; G/177ML
17.5-3.13-1.6 SOLUTION ORAL
Qty: 354 | Refills: 0 | Status: ACTIVE | COMMUNITY
Start: 2025-04-01 | End: 1900-01-01

## 2025-06-10 ENCOUNTER — APPOINTMENT (OUTPATIENT)
Dept: GASTROENTEROLOGY | Facility: AMBULATORY SURGERY CENTER | Age: 45
End: 2025-06-10
Payer: COMMERCIAL

## 2025-06-10 ENCOUNTER — RESULT REVIEW (OUTPATIENT)
Age: 45
End: 2025-06-10

## 2025-06-10 PROCEDURE — 45378 DIAGNOSTIC COLONOSCOPY: CPT

## 2025-06-10 PROCEDURE — 43239 EGD BIOPSY SINGLE/MULTIPLE: CPT

## 2025-06-13 ENCOUNTER — TRANSCRIPTION ENCOUNTER (OUTPATIENT)
Age: 45
End: 2025-06-13

## 2025-07-15 ENCOUNTER — NON-APPOINTMENT (OUTPATIENT)
Age: 45
End: 2025-07-15

## 2025-07-17 ENCOUNTER — APPOINTMENT (OUTPATIENT)
Dept: GASTROENTEROLOGY | Facility: CLINIC | Age: 45
End: 2025-07-17
Payer: COMMERCIAL

## 2025-07-17 VITALS
TEMPERATURE: 98 F | HEART RATE: 79 BPM | HEIGHT: 63 IN | OXYGEN SATURATION: 99 % | BODY MASS INDEX: 30.16 KG/M2 | RESPIRATION RATE: 14 BRPM | WEIGHT: 170.25 LBS | DIASTOLIC BLOOD PRESSURE: 70 MMHG | SYSTOLIC BLOOD PRESSURE: 108 MMHG

## 2025-07-17 PROBLEM — D64.9 ANEMIA, MILD: Status: ACTIVE | Noted: 2025-07-17

## 2025-07-17 PROBLEM — E05.00 GRAVES DISEASE: Status: ACTIVE | Noted: 2025-07-17

## 2025-07-17 PROCEDURE — 99214 OFFICE O/P EST MOD 30 MIN: CPT

## 2025-07-23 ENCOUNTER — APPOINTMENT (OUTPATIENT)
Age: 45
End: 2025-07-23
Payer: COMMERCIAL

## 2025-07-23 ENCOUNTER — APPOINTMENT (OUTPATIENT)
Dept: GASTROENTEROLOGY | Facility: CLINIC | Age: 45
End: 2025-07-23
Payer: COMMERCIAL

## 2025-07-23 VITALS
HEART RATE: 70 BPM | HEIGHT: 63 IN | RESPIRATION RATE: 14 BRPM | WEIGHT: 168 LBS | SYSTOLIC BLOOD PRESSURE: 114 MMHG | OXYGEN SATURATION: 96 % | BODY MASS INDEX: 29.77 KG/M2 | DIASTOLIC BLOOD PRESSURE: 78 MMHG | TEMPERATURE: 97.1 F

## 2025-07-23 DIAGNOSIS — E03.9 HYPOTHYROIDISM, UNSPECIFIED: ICD-10-CM

## 2025-07-23 DIAGNOSIS — F41.9 ANXIETY DISORDER, UNSPECIFIED: ICD-10-CM

## 2025-07-23 PROCEDURE — 91110 GI TRC IMG INTRAL ESOPH-ILE: CPT

## 2025-07-23 PROCEDURE — 99213 OFFICE O/P EST LOW 20 MIN: CPT

## 2025-07-23 RX ORDER — LEVOTHYROXINE SODIUM 0.07 MG/1
75 TABLET ORAL
Qty: 90 | Refills: 1 | Status: ACTIVE | COMMUNITY
Start: 2025-07-23

## 2025-07-31 ENCOUNTER — TRANSCRIPTION ENCOUNTER (OUTPATIENT)
Age: 45
End: 2025-07-31

## 2025-07-31 ENCOUNTER — NON-APPOINTMENT (OUTPATIENT)
Age: 45
End: 2025-07-31

## 2025-09-02 ENCOUNTER — APPOINTMENT (OUTPATIENT)
Age: 45
End: 2025-09-02
Payer: COMMERCIAL

## 2025-09-02 VITALS
RESPIRATION RATE: 16 BRPM | BODY MASS INDEX: 28.35 KG/M2 | SYSTOLIC BLOOD PRESSURE: 102 MMHG | DIASTOLIC BLOOD PRESSURE: 70 MMHG | OXYGEN SATURATION: 100 % | HEART RATE: 114 BPM | WEIGHT: 160 LBS | HEIGHT: 63 IN | TEMPERATURE: 98.2 F

## 2025-09-02 DIAGNOSIS — R59.0 LOCALIZED ENLARGED LYMPH NODES: ICD-10-CM

## 2025-09-02 DIAGNOSIS — D64.9 ANEMIA, UNSPECIFIED: ICD-10-CM

## 2025-09-02 PROCEDURE — 99213 OFFICE O/P EST LOW 20 MIN: CPT

## 2025-09-03 LAB
BASOPHILS # BLD AUTO: 0.04 K/UL
BASOPHILS NFR BLD AUTO: 0.6 %
EOSINOPHIL # BLD AUTO: 0.05 K/UL
EOSINOPHIL NFR BLD AUTO: 0.7 %
HCT VFR BLD CALC: 42.3 %
HGB BLD-MCNC: 14 G/DL
IMM GRANULOCYTES NFR BLD AUTO: 0.3 %
LYMPHOCYTES # BLD AUTO: 2.73 K/UL
LYMPHOCYTES NFR BLD AUTO: 39.7 %
MAN DIFF?: NORMAL
MCHC RBC-ENTMCNC: 28.4 PG
MCHC RBC-ENTMCNC: 33.1 G/DL
MCV RBC AUTO: 85.8 FL
MONOCYTES # BLD AUTO: 0.46 K/UL
MONOCYTES NFR BLD AUTO: 6.7 %
NEUTROPHILS # BLD AUTO: 3.58 K/UL
NEUTROPHILS NFR BLD AUTO: 52 %
PLATELET # BLD AUTO: 324 K/UL
RBC # BLD: 4.93 M/UL
RBC # FLD: 13.1 %
WBC # FLD AUTO: 6.88 K/UL